# Patient Record
Sex: MALE | Race: ASIAN | NOT HISPANIC OR LATINO | Employment: FULL TIME | URBAN - METROPOLITAN AREA
[De-identification: names, ages, dates, MRNs, and addresses within clinical notes are randomized per-mention and may not be internally consistent; named-entity substitution may affect disease eponyms.]

---

## 2019-12-03 ENCOUNTER — OFFICE VISIT (OUTPATIENT)
Dept: FAMILY MEDICINE CLINIC | Facility: CLINIC | Age: 44
End: 2019-12-03

## 2019-12-03 VITALS
SYSTOLIC BLOOD PRESSURE: 118 MMHG | HEIGHT: 70 IN | HEART RATE: 67 BPM | OXYGEN SATURATION: 99 % | WEIGHT: 183 LBS | BODY MASS INDEX: 26.2 KG/M2 | DIASTOLIC BLOOD PRESSURE: 76 MMHG | TEMPERATURE: 97.5 F

## 2019-12-03 DIAGNOSIS — I10 ESSENTIAL HYPERTENSION: Primary | ICD-10-CM

## 2019-12-03 DIAGNOSIS — K21.9 GERD WITHOUT ESOPHAGITIS: ICD-10-CM

## 2019-12-03 DIAGNOSIS — E53.8 B12 DEFICIENCY: ICD-10-CM

## 2019-12-03 DIAGNOSIS — R20.0 NUMBNESS OF TONGUE: ICD-10-CM

## 2019-12-03 PROCEDURE — 99213 OFFICE O/P EST LOW 20 MIN: CPT | Performed by: FAMILY MEDICINE

## 2019-12-03 RX ORDER — METOPROLOL TARTRATE 50 MG/1
50 TABLET, FILM COATED ORAL DAILY
COMMUNITY
End: 2019-12-03 | Stop reason: SDUPTHER

## 2019-12-03 RX ORDER — METOPROLOL TARTRATE 50 MG/1
50 TABLET, FILM COATED ORAL DAILY
Qty: 90 TABLET | Refills: 1 | Status: SHIPPED | OUTPATIENT
Start: 2019-12-03 | End: 2020-03-11 | Stop reason: CLARIF

## 2019-12-03 NOTE — PROGRESS NOTES
Chief Complaint   Patient presents with   • Tongue     C/o tongue sensitivity.        Subjective   Deon Diaz is a 44 y.o. male.     History of Present Illness   Pt was having some chest pains/reflux over few weeks and worse with certain foods.  He started pantoprazole and started to feel better.  [place] was [place] this [place] and had a full cardiac work up done.  No SOB or palpitations.  Since he started the pantoprazole he has decreased taste sensation.  When he stopped it, it improved somewhat.  He now has burning feeling in mouth with spicy foods and he didn't used to have that issue in the past.  No dysphagia.  htn- no complaints and no HA or blurred vision.  He brought in all of his labwork and I have reviewed it all and the B12 was 314 which is slightly low.    The following portions of the patient's history were reviewed and updated as appropriate: allergies, current medications, past family history, past medical history, past social history, past surgical history and problem list.    Review of Systems   Constitutional: Negative for fatigue.   Eyes: Negative for blurred vision.   Respiratory: Negative for cough, chest tightness and shortness of breath.    Cardiovascular: Negative for chest pain, palpitations and leg swelling.   Neurological: Negative for dizziness, light-headedness and headache.       Patient Active Problem List   Diagnosis   • Subclinical hyperthyroidism   • IBS (irritable bowel syndrome)   • Essential hypertension   • GERD without esophagitis   • Numbness of tongue   • B12 deficiency       No Known Allergies      Current Outpatient Medications:   •  metoprolol tartrate (LOPRESSOR) 50 MG tablet, Take 1 tablet by mouth Daily., Disp: 90 tablet, Rfl: 1    Past Medical History:   Diagnosis Date   • Dysuria    • Encounter for preventive health examination    • Essential hypertension    • Fatigue    • Hematuria    • IBS (irritable bowel syndrome)    • Influenza    • Normal routine  "physical examination    • Subclinical hyperthyroidism        History reviewed. No pertinent surgical history.    Family History   Family history unknown: Yes       Social History     Tobacco Use   • Smoking status: Never Smoker   • Smokeless tobacco: Never Used   Substance Use Topics   • Alcohol use: No     Frequency: Never            Objective     Visit Vitals  /76   Pulse 67   Temp 97.5 °F (36.4 °C)   Ht 177.8 cm (70\")   Wt 83 kg (183 lb)   SpO2 99%   BMI 26.26 kg/m²       Physical Exam   Constitutional: He is oriented to person, place, and time. He appears well-developed. No distress.   HENT:   Mouth/Throat: No oropharyngeal exudate.   Eyes: Conjunctivae and lids are normal.   Neck: Trachea normal and normal range of motion. Neck supple. Carotid bruit is not present. No thyroid mass and no thyromegaly present.   Cardiovascular: Normal rate, regular rhythm and normal heart sounds.   Pulmonary/Chest: Effort normal and breath sounds normal.   Lymphadenopathy:     He has no cervical adenopathy.   Neurological: He is alert and oriented to person, place, and time. He is not disoriented. No cranial nerve deficit.   Skin: Skin is warm and dry.   Psychiatric: He has a normal mood and affect. His speech is normal and behavior is normal. He is attentive.         Procedures    Assessment/Plan   Problems Addressed this Visit        Cardiovascular and Mediastinum    Essential hypertension - Primary    Relevant Medications    metoprolol tartrate (LOPRESSOR) 50 MG tablet       Digestive    GERD without esophagitis    B12 deficiency       Nervous and Auditory    Numbness of tongue          No orders of the defined types were placed in this encounter.      Current Outpatient Medications   Medication Sig Dispense Refill   • metoprolol tartrate (LOPRESSOR) 50 MG tablet Take 1 tablet by mouth Daily. 90 tablet 1     No current facility-administered medications for this visit.      Start b12 pills and use omeprazole as needed.  "   No Follow-up on file.    There are no Patient Instructions on file for this visit.

## 2019-12-23 ENCOUNTER — TELEPHONE (OUTPATIENT)
Dept: FAMILY MEDICINE CLINIC | Facility: CLINIC | Age: 44
End: 2019-12-23

## 2020-01-02 ENCOUNTER — CLINICAL SUPPORT (OUTPATIENT)
Dept: FAMILY MEDICINE CLINIC | Facility: CLINIC | Age: 45
End: 2020-01-02

## 2020-01-02 DIAGNOSIS — E53.8 B12 DEFICIENCY: Primary | ICD-10-CM

## 2020-01-02 PROCEDURE — 96372 THER/PROPH/DIAG INJ SC/IM: CPT | Performed by: FAMILY MEDICINE

## 2020-01-02 RX ORDER — CYANOCOBALAMIN 1000 UG/ML
1000 INJECTION, SOLUTION INTRAMUSCULAR; SUBCUTANEOUS WEEKLY
Status: DISCONTINUED | OUTPATIENT
Start: 2020-01-02 | End: 2021-11-30 | Stop reason: ALTCHOICE

## 2020-01-02 RX ORDER — CYANOCOBALAMIN 1000 UG/ML
1000 INJECTION, SOLUTION INTRAMUSCULAR; SUBCUTANEOUS
Status: DISCONTINUED | OUTPATIENT
Start: 2020-01-02 | End: 2020-01-02

## 2020-01-02 RX ADMIN — CYANOCOBALAMIN 1000 MCG: 1000 INJECTION, SOLUTION INTRAMUSCULAR; SUBCUTANEOUS at 16:48

## 2020-01-09 ENCOUNTER — CLINICAL SUPPORT (OUTPATIENT)
Dept: FAMILY MEDICINE CLINIC | Facility: CLINIC | Age: 45
End: 2020-01-09

## 2020-01-09 DIAGNOSIS — E53.8 B12 DEFICIENCY: Primary | ICD-10-CM

## 2020-01-09 PROCEDURE — 96372 THER/PROPH/DIAG INJ SC/IM: CPT | Performed by: FAMILY MEDICINE

## 2020-01-09 RX ADMIN — CYANOCOBALAMIN 1000 MCG: 1000 INJECTION, SOLUTION INTRAMUSCULAR; SUBCUTANEOUS at 11:57

## 2020-01-16 ENCOUNTER — CLINICAL SUPPORT (OUTPATIENT)
Dept: FAMILY MEDICINE CLINIC | Facility: CLINIC | Age: 45
End: 2020-01-16

## 2020-01-16 DIAGNOSIS — E53.8 B12 DEFICIENCY: ICD-10-CM

## 2020-01-16 PROCEDURE — 96372 THER/PROPH/DIAG INJ SC/IM: CPT | Performed by: FAMILY MEDICINE

## 2020-01-16 RX ADMIN — CYANOCOBALAMIN 1000 MCG: 1000 INJECTION, SOLUTION INTRAMUSCULAR; SUBCUTANEOUS at 16:10

## 2020-01-30 ENCOUNTER — CLINICAL SUPPORT (OUTPATIENT)
Dept: FAMILY MEDICINE CLINIC | Facility: CLINIC | Age: 45
End: 2020-01-30

## 2020-01-30 DIAGNOSIS — E53.8 B12 DEFICIENCY: ICD-10-CM

## 2020-01-30 PROCEDURE — 96372 THER/PROPH/DIAG INJ SC/IM: CPT | Performed by: FAMILY MEDICINE

## 2020-01-30 RX ADMIN — CYANOCOBALAMIN 1000 MCG: 1000 INJECTION, SOLUTION INTRAMUSCULAR; SUBCUTANEOUS at 13:17

## 2020-03-10 ENCOUNTER — PATIENT MESSAGE (OUTPATIENT)
Dept: FAMILY MEDICINE CLINIC | Facility: CLINIC | Age: 45
End: 2020-03-10

## 2020-03-11 RX ORDER — METOPROLOL SUCCINATE 50 MG/1
50 TABLET, EXTENDED RELEASE ORAL DAILY
Qty: 90 TABLET | Refills: 0 | Status: SHIPPED | OUTPATIENT
Start: 2020-03-11 | End: 2020-06-05

## 2020-05-19 ENCOUNTER — TELEMEDICINE (OUTPATIENT)
Dept: FAMILY MEDICINE CLINIC | Facility: CLINIC | Age: 45
End: 2020-05-19

## 2020-05-19 DIAGNOSIS — J01.00 ACUTE NON-RECURRENT MAXILLARY SINUSITIS: Primary | ICD-10-CM

## 2020-05-19 PROCEDURE — 99213 OFFICE O/P EST LOW 20 MIN: CPT | Performed by: FAMILY MEDICINE

## 2020-05-19 RX ORDER — AMOXICILLIN 875 MG/1
875 TABLET, COATED ORAL 2 TIMES DAILY
Qty: 20 TABLET | Refills: 0 | Status: SHIPPED | OUTPATIENT
Start: 2020-05-19 | End: 2021-11-30 | Stop reason: ALTCHOICE

## 2020-05-19 NOTE — PROGRESS NOTES
Chief Complaint   Patient presents with   • Sinusitis       Subjective   Deon Worthy is a 44 y.o. male.     History of Present Illness   The patient presents today for follow-up via a video visit due to the COVID-19 pandemic for  Past 10 days he has been having left side sinus pain on cheek and forehead.  Over past few days he has been taking zyrtec and cold med.  Lots of drainage.  No fever or chills.  No SOB or wheezing.  No loss of taste or smell.            The following portions of the patient's history were reviewed and updated as appropriate: allergies, current medications, past family history, past medical history, past social history, past surgical history and problem list.    Review of Systems see above    Patient Active Problem List   Diagnosis   • Subclinical hyperthyroidism   • IBS (irritable bowel syndrome)   • Essential hypertension   • GERD without esophagitis   • Numbness of tongue   • B12 deficiency   • Acute non-recurrent maxillary sinusitis       No Known Allergies      Current Outpatient Medications:   •  amoxicillin (AMOXIL) 875 MG tablet, Take 1 tablet by mouth 2 (Two) Times a Day., Disp: 20 tablet, Rfl: 0  •  metoprolol succinate XL (TOPROL-XL) 50 MG 24 hr tablet, Take 1 tablet by mouth Daily., Disp: 90 tablet, Rfl: 0    Current Facility-Administered Medications:   •  cyanocobalamin injection 1,000 mcg, 1,000 mcg, Intramuscular, Weekly, Meenakshi Singh MD, 1,000 mcg at 01/30/20 1317    Past Medical History:   Diagnosis Date   • Encounter for preventive health examination    • Essential hypertension    • Hematuria    • IBS (irritable bowel syndrome)    • Normal routine physical examination    • Subclinical hyperthyroidism        Past Surgical History:   Procedure Laterality Date   • NO PAST SURGERIES         Family History   Problem Relation Age of Onset   • Hypertension Mother    • Hyperlipidemia Mother    • Heart failure Father        Social History     Tobacco Use   • Smoking  status: Never Smoker   • Smokeless tobacco: Never Used   Substance Use Topics   • Alcohol use: No     Frequency: Never            Objective     There were no vitals taken for this visit. Video Visit    Physical Exam  NAD  AAOx3  No labored breathing.  EOMI   PERRLA      No results found for: GLUCOSE, BUN, CREATININE, EGFRIFNONA, EGFRIFAFRI, BCR, K, CO2, CALCIUM, PROTENTOTREF, ALBUMIN, LABIL2, BILIRUBIN, AST, ALT    No results found for: WBC, RBC, HGB, HCT, MCV, MCH, MCHC, RDW, RDWSD, MPV, PLT, NEUTRORELPCT, LYMPHORELPCT, MONORELPCT, EOSRELPCT, BASORELPCT, AUTOIGPER, NEUTROABS, LYMPHSABS, MONOSABS, EOSABS, BASOSABS, AUTOIGNUM, NRBC    No results found for: HGBA1C    No results found for: [id number]    No results found for: TSH    No results found for: CHOL  No results found for: TRIG  No results found for: HDL  No results found for: LDL  No results found for: VLDL  No results found for: LDLHDL      Procedures    Assessment/Plan   Problems Addressed this Visit        Respiratory    Acute non-recurrent maxillary sinusitis - Primary    Relevant Medications    amoxicillin (AMOXIL) 875 MG tablet          No orders of the defined types were placed in this encounter.      Current Outpatient Medications   Medication Sig Dispense Refill   • amoxicillin (AMOXIL) 875 MG tablet Take 1 tablet by mouth 2 (Two) Times a Day. 20 tablet 0   • metoprolol succinate XL (TOPROL-XL) 50 MG 24 hr tablet Take 1 tablet by mouth Daily. 90 tablet 0     Current Facility-Administered Medications   Medication Dose Route Frequency Provider Last Rate Last Dose   • cyanocobalamin injection 1,000 mcg  1,000 mcg Intramuscular Weekly Meenakshi Singh MD   1,000 mcg at 01/30/20 1317       No follow-ups on file.    Patient Instructions   Continue to work on exercise.    Symptomatic treatment and otc meds and fluids and rest.  Follow up if no better.    Start abx.       Time spent on visit:  14   minutes.  This patient has consented to a telehealth visit  via video. The visit was scheduled as a video visit to comply with patient safety concerns in accordance with CDC recommendations.  All vitals recorded within this visit are reported by the patient.

## 2020-06-05 RX ORDER — METOPROLOL SUCCINATE 50 MG/1
TABLET, EXTENDED RELEASE ORAL
Qty: 90 TABLET | Refills: 0 | Status: SHIPPED | OUTPATIENT
Start: 2020-06-05 | End: 2020-11-25 | Stop reason: SDUPTHER

## 2020-11-24 RX ORDER — METOPROLOL SUCCINATE 50 MG/1
TABLET, EXTENDED RELEASE ORAL
Qty: 90 TABLET | Refills: 0 | OUTPATIENT
Start: 2020-11-24

## 2020-11-25 RX ORDER — METOPROLOL SUCCINATE 50 MG/1
50 TABLET, EXTENDED RELEASE ORAL DAILY
Qty: 90 TABLET | Refills: 0 | Status: SHIPPED | OUTPATIENT
Start: 2020-11-25 | End: 2021-03-26 | Stop reason: SDUPTHER

## 2020-11-25 NOTE — TELEPHONE ENCOUNTER
Please see the message from the patient below. I replied back asking that he call to schedule a video or telephone visit if he didn't want to come into the office. Can we send in a refill to get him through the extended weekend?     LOV 5/19/2020  Last refill 6/5/20 qty 90    Dr. Singh,     I would like to get the refill of my BP medication Metoprolol Succinate ER-50mg. The refill request I placed through my pharmacy was declined. I would like to get this refill without visiting the doctors office due to current COVID-19 situation. I have been checking by BP regularly[weekly] at home with BP monitor and it is consistently around 115/80. I have been taking the same medication and dosage for last 5+year. Please let me know if I can get one refill for this medication without visiting your office due to current circumstance.     Thanks,  Deon Worthy

## 2021-03-16 RX ORDER — METOPROLOL SUCCINATE 50 MG/1
TABLET, EXTENDED RELEASE ORAL
Qty: 90 TABLET | Refills: 0 | OUTPATIENT
Start: 2021-03-16

## 2021-03-26 ENCOUNTER — TELEMEDICINE (OUTPATIENT)
Dept: FAMILY MEDICINE CLINIC | Facility: CLINIC | Age: 46
End: 2021-03-26

## 2021-03-26 DIAGNOSIS — I10 ESSENTIAL HYPERTENSION: Primary | ICD-10-CM

## 2021-03-26 DIAGNOSIS — K58.8 OTHER IRRITABLE BOWEL SYNDROME: ICD-10-CM

## 2021-03-26 PROCEDURE — 99214 OFFICE O/P EST MOD 30 MIN: CPT | Performed by: FAMILY MEDICINE

## 2021-03-26 RX ORDER — DICYCLOMINE HYDROCHLORIDE 10 MG/1
CAPSULE ORAL
Qty: 90 CAPSULE | Refills: 3 | Status: SHIPPED | OUTPATIENT
Start: 2021-03-26 | End: 2021-11-30 | Stop reason: ALTCHOICE

## 2021-03-26 RX ORDER — METOPROLOL SUCCINATE 50 MG/1
50 TABLET, EXTENDED RELEASE ORAL DAILY
Qty: 90 TABLET | Refills: 1 | Status: SHIPPED | OUTPATIENT
Start: 2021-03-26 | End: 2021-10-04

## 2021-03-26 NOTE — PROGRESS NOTES
CC:  HTN    Subjective   Deon Worthy is a 45 y.o. male.     History of Present Illness   The patient presents today for follow-up via a video visit due to the COVID-19 pandemic for  Hypertension-no chest pain, blurry vision, headaches or palpitations.  He has been on this med dosage for past 5 years.  Over past few weeks it has been close to 120s/80-90.  He does exercise regularly.  But no cardio.    ibs- needs refills and doing well with medication.          The following portions of the patient's history were reviewed and updated as appropriate: allergies, current medications, past family history, past medical history, past social history, past surgical history and problem list.    Review of Systems    Patient Active Problem List   Diagnosis   • Subclinical hyperthyroidism   • IBS (irritable bowel syndrome)   • Essential hypertension   • GERD without esophagitis   • Numbness of tongue   • B12 deficiency   • Acute non-recurrent maxillary sinusitis       No Known Allergies      Current Outpatient Medications:   •  amoxicillin (AMOXIL) 875 MG tablet, Take 1 tablet by mouth 2 (Two) Times a Day., Disp: 20 tablet, Rfl: 0  •  dicyclomine (Bentyl) 10 MG capsule, Take on pill tid as needed, Disp: 90 capsule, Rfl: 3  •  metoprolol succinate XL (TOPROL-XL) 50 MG 24 hr tablet, Take 1 tablet by mouth Daily., Disp: 90 tablet, Rfl: 1    Current Facility-Administered Medications:   •  cyanocobalamin injection 1,000 mcg, 1,000 mcg, Intramuscular, Weekly, Meenakshi Singh MD, 1,000 mcg at 01/30/20 1317    Past Medical History:   Diagnosis Date   • Encounter for preventive health examination    • Essential hypertension    • Hematuria    • IBS (irritable bowel syndrome)    • Normal routine physical examination    • Subclinical hyperthyroidism        Past Surgical History:   Procedure Laterality Date   • NO PAST SURGERIES         Family History   Problem Relation Age of Onset   • Hypertension Mother    • Hyperlipidemia  Mother    • Heart failure Father        Social History     Tobacco Use   • Smoking status: Never Smoker   • Smokeless tobacco: Never Used   Substance Use Topics   • Alcohol use: No            Objective     There were no vitals taken for this visit. Video Visit    Physical Exam  NAD  AAOx3  No labored breathing.    No results found for: GLUCOSE, BUN, CREATININE, EGFRIFNONA, EGFRIFAFRI, BCR, K, CO2, CALCIUM, PROTENTOTREF, ALBUMIN, LABIL2, BILIRUBIN, AST, ALT    No results found for: WBC, RBC, HGB, HCT, MCV, MCH, MCHC, RDW, RDWSD, MPV, PLT, NEUTRORELPCT, LYMPHORELPCT, MONORELPCT, EOSRELPCT, BASORELPCT, AUTOIGPER, NEUTROABS, LYMPHSABS, MONOSABS, EOSABS, BASOSABS, AUTOIGNUM, NRBC    No results found for: HGBA1C    No results found for: TPTACOAO27    No results found for: TSH    No results found for: CHOL  No results found for: TRIG  No results found for: HDL  No results found for: LDL  No results found for: VLDL  No results found for: LDLHDL      Procedures    Assessment/Plan   Problems Addressed this Visit     IBS (irritable bowel syndrome)    Relevant Medications    dicyclomine (Bentyl) 10 MG capsule    Essential hypertension - Primary    Relevant Medications    metoprolol succinate XL (TOPROL-XL) 50 MG 24 hr tablet      Diagnoses       Codes Comments    Essential hypertension    -  Primary ICD-10-CM: I10  ICD-9-CM: 401.9     Other irritable bowel syndrome     ICD-10-CM: K58.8  ICD-9-CM: 564.1           No orders of the defined types were placed in this encounter.      Current Outpatient Medications   Medication Sig Dispense Refill   • amoxicillin (AMOXIL) 875 MG tablet Take 1 tablet by mouth 2 (Two) Times a Day. 20 tablet 0   • dicyclomine (Bentyl) 10 MG capsule Take on pill tid as needed 90 capsule 3   • metoprolol succinate XL (TOPROL-XL) 50 MG 24 hr tablet Take 1 tablet by mouth Daily. 90 tablet 1     Current Facility-Administered Medications   Medication Dose Route Frequency Provider Last Rate Last Admin   •  cyanocobalamin injection 1,000 mcg  1,000 mcg Intramuscular Weekly Meenakshi Singh MD   1,000 mcg at 01/30/20 1317       No follow-ups on file.    There are no Patient Instructions on file for this visit.         Time spent on visit:  23   minutes.  This patient has consented to a telehealth visit via video. The visit was scheduled as a video visit to comply with patient safety concerns in accordance with CDC recommendations.  All vitals recorded within this visit are reported by the patient.  Will get blood work once he gets his second vaccine.    He will take 100 mg of next week .    Patient to monitor BP over next week and call me with readings at that time and we can make adjustments accordingly if needed.  Given warning signs for stroke and MI.    Will get labs when he makes his CPE.    Will order a EKG for earlier.

## 2021-10-04 DIAGNOSIS — I10 ESSENTIAL HYPERTENSION: ICD-10-CM

## 2021-10-04 RX ORDER — METOPROLOL SUCCINATE 50 MG/1
TABLET, EXTENDED RELEASE ORAL
Qty: 30 TABLET | Refills: 0 | Status: SHIPPED | OUTPATIENT
Start: 2021-10-04 | End: 2021-10-28 | Stop reason: SDUPTHER

## 2021-10-04 NOTE — TELEPHONE ENCOUNTER
Rx Refill Note  Requested Prescriptions     Pending Prescriptions Disp Refills   • metoprolol succinate XL (TOPROL-XL) 50 MG 24 hr tablet [Pharmacy Med Name: METOPROLOL SUCC ER 50 MG TAB] 90 tablet 1     Sig: TAKE 1 TABLET BY MOUTH EVERY DAY      Last office visit with prescribing clinician:   3/26/2021  Next office visit with prescribing clinician: Visit date not found            Roberto Whitman MA  10/04/21, 09:52 EDT

## 2021-10-25 ENCOUNTER — TELEPHONE (OUTPATIENT)
Dept: FAMILY MEDICINE CLINIC | Facility: CLINIC | Age: 46
End: 2021-10-25

## 2021-10-25 NOTE — TELEPHONE ENCOUNTER
FYI:  Pt called stating he was having chest discomfort and requested a EKG. Pt was instructed to go to the ER for higher level of care.

## 2021-10-28 ENCOUNTER — OFFICE VISIT (OUTPATIENT)
Dept: FAMILY MEDICINE CLINIC | Facility: CLINIC | Age: 46
End: 2021-10-28

## 2021-10-28 VITALS
WEIGHT: 178.2 LBS | SYSTOLIC BLOOD PRESSURE: 114 MMHG | BODY MASS INDEX: 25.51 KG/M2 | TEMPERATURE: 97.8 F | HEIGHT: 70 IN | OXYGEN SATURATION: 100 % | RESPIRATION RATE: 16 BRPM | HEART RATE: 77 BPM | DIASTOLIC BLOOD PRESSURE: 70 MMHG

## 2021-10-28 DIAGNOSIS — I10 ESSENTIAL HYPERTENSION: ICD-10-CM

## 2021-10-28 DIAGNOSIS — E55.9 VITAMIN D DEFICIENCY: ICD-10-CM

## 2021-10-28 DIAGNOSIS — R07.81 RIB PAIN ON LEFT SIDE: ICD-10-CM

## 2021-10-28 DIAGNOSIS — R07.89 CHEST DISCOMFORT: Primary | ICD-10-CM

## 2021-10-28 PROCEDURE — 99214 OFFICE O/P EST MOD 30 MIN: CPT | Performed by: FAMILY MEDICINE

## 2021-10-28 PROCEDURE — 93000 ELECTROCARDIOGRAM COMPLETE: CPT | Performed by: FAMILY MEDICINE

## 2021-10-28 RX ORDER — METOPROLOL SUCCINATE 50 MG/1
50 TABLET, EXTENDED RELEASE ORAL DAILY
Qty: 90 TABLET | Refills: 1 | Status: SHIPPED | OUTPATIENT
Start: 2021-10-28 | End: 2022-05-20

## 2021-10-28 NOTE — PROGRESS NOTES
"Chief Complaint  Rib Pain (left rib pain)    Subjective          Deon Worthy presents to [facility]  History of Present Illness  PT is here for refills and  HTN- no HA or blurred vision  He has some rib pain on the left side for the past 3-4 days.  He has had a similar episode some time ago and went away on its own with heat and ice.  No radiation of the pain.    He had lab work done in [place] and showed Vit D was at 27  Objective   Vital Signs:   /70 (BP Location: Right arm, Patient Position: Sitting, Cuff Size: Large Adult)   Pulse 77   Temp 97.8 °F (36.6 °C) (Temporal)   Resp 16   Ht 177.8 cm (70\")   Wt 80.8 kg (178 lb 3.2 oz)   SpO2 100%   BMI 25.57 kg/m²     Physical Exam  Vitals and nursing note reviewed.   Constitutional:       Appearance: Normal appearance.   Cardiovascular:      Rate and Rhythm: Normal rate and regular rhythm.      Heart sounds: Normal heart sounds. No murmur heard.      Pulmonary:      Effort: Pulmonary effort is normal. No respiratory distress.      Breath sounds: Normal breath sounds. No stridor.      Comments: Some ttp lower ribs left side ant and laterally.  Neurological:      General: No focal deficit present.      Mental Status: He is alert and oriented to person, place, and time.   Psychiatric:         Mood and Affect: Mood normal.     Answers for HPI/ROS submitted by the patient on 10/26/2021  What is the primary reason for your visit?: High Blood Pressure       Result Review :            ECG 12 Lead    Date/Time: 10/28/2021 1:38 PM  Performed by: Meenakshi Singh MD  Authorized by: Meenakshi Singh MD   Comparison: not compared with previous ECG   Rhythm: sinus rhythm  Conduction: conduction normal  ST Segments: ST segments normal  T Waves: T waves normal  Other: no other findings    Clinical impression: normal ECG              Assessment and Plan    Diagnoses and all orders for this visit:    1. Chest discomfort (Primary)  -   "   ECG 12 Lead    2. Essential hypertension  -     metoprolol succinate XL (TOPROL-XL) 50 MG 24 hr tablet; Take 1 tablet by mouth Daily.  Dispense: 90 tablet; Refill: 1    3. Vitamin D deficiency    4. Rib pain on left side        Follow Up   Return in about 6 months (around 4/28/2022) for hypertension.  Patient was given instructions and counseling regarding his condition or for health maintenance advice. Please see specific information pulled into the AVS if appropriate.     Will get EKG, start Vit D otc supplement.Answers for HPI/ROS submitted by the patient on 10/26/2021  Refills and EKG normal.    sxtx with ice and heat and nsaids.  Follow up if no better.

## 2021-11-30 ENCOUNTER — OFFICE VISIT (OUTPATIENT)
Dept: CARDIOLOGY | Facility: CLINIC | Age: 46
End: 2021-11-30

## 2021-11-30 VITALS
OXYGEN SATURATION: 99 % | WEIGHT: 180 LBS | DIASTOLIC BLOOD PRESSURE: 84 MMHG | HEART RATE: 85 BPM | HEIGHT: 70 IN | BODY MASS INDEX: 25.77 KG/M2 | SYSTOLIC BLOOD PRESSURE: 126 MMHG

## 2021-11-30 DIAGNOSIS — I10 ESSENTIAL HYPERTENSION: ICD-10-CM

## 2021-11-30 DIAGNOSIS — R07.89 CHEST DISCOMFORT: Primary | ICD-10-CM

## 2021-11-30 PROCEDURE — 99204 OFFICE O/P NEW MOD 45 MIN: CPT | Performed by: INTERNAL MEDICINE

## 2021-12-03 NOTE — PROGRESS NOTES
"      CARDIOLOGY    Sae Becker MD    ENCOUNTER DATE:  2021    Deon Worthy / 46 y.o. / male        CHIEF COMPLAINT / REASON FOR OFFICE VISIT     Family history of heart disease      HISTORY OF PRESENT ILLNESS       HPI  Deon Worthy is a 46 y.o. male who presents today for consultation.  Patient has a very strong family history of coronary artery disease.  Patient had his 1st myocardial infarction at the age of 54 he subsequently  the age of 62.  Patient remains very active.  He does either a treadmill or elliptical 4 to 5 days a week exercising between 30 and 45 minutes a day.  Patient started having some episodes of chest discomfort.  He says it is on the left side of his chest in the lateral aspect and says if he pushes on it he can change its character.  However with a strong family history the still concerned him so he was seen today for further evaluation.      The following portions of the patient's history were reviewed and updated as appropriate: allergies, current medications, past family history, past medical history, past social history, past surgical history and problem list.      VITAL SIGNS     Visit Vitals  /84 (BP Location: Left arm)   Pulse 85   Ht 177.8 cm (70\")   Wt 81.6 kg (180 lb)   SpO2 99%   BMI 25.83 kg/m²         Wt Readings from Last 3 Encounters:   21 81.6 kg (180 lb)   10/28/21 80.8 kg (178 lb 3.2 oz)   19 83 kg (183 lb)     Body mass index is 25.83 kg/m².      REVIEW OF SYSTEMS   Review of Systems   All other systems reviewed and are negative.          PHYSICAL EXAMINATION     Vitals reviewed.   Constitutional:       Appearance: Healthy appearance.   Pulmonary:      Effort: Pulmonary effort is normal.   Cardiovascular:      Normal rate. Regular rhythm. Normal S1. Normal S2.      Murmurs: There is no murmur.      No gallop. No click. No rub.   Pulses:     Intact distal pulses.   Edema:     Peripheral edema absent.   Neurological:      " Mental Status: Alert and oriented to person, place and time.           REVIEWED DATA     Procedures    Cardiac Procedures:  1.           ASSESSMENT & PLAN      Diagnosis Plan   1. Chest discomfort  Treadmill Stress Test   2. Essential hypertension           SUMMARY/DISCUSSION  1. Atypical chest discomfort but a strong family history.  He did bring an echocardiogram from [place] he said he used to get an echo every year.  Results were normal he did not really have any valvular abnormalities.  In light of that I told him is not really appropriate to do a echo every year.  He also get a stress test from time to time looking back he did very well with it.  Blood pressure did go up on his last treadmill stress test.  With the current symptoms I did set him up for a plain treadmill.  We will see if he has a discomfort which again is very typical to be muscle skeletal in origin based on his description but we will also see what happens to his blood pressure.  If unremarkable I will see him back in a year sooner if he has issues.        MEDICATIONS         Discharge Medications          Accurate as of November 30, 2021 11:59 PM. If you have any questions, ask your nurse or doctor.            Continue These Medications      Instructions Start Date   metoprolol succinate XL 50 MG 24 hr tablet  Commonly known as: TOPROL-XL   50 mg, Oral, Daily                 **Dragon Disclaimer:   Much of this encounter note is an electronic transcription/translation of spoken language to printed text. The electronic translation of spoken language may permit erroneous, or at times, nonsensical words or phrases to be inadvertently transcribed. Although I have reviewed the note for such errors, some may still exist.

## 2021-12-09 ENCOUNTER — HOSPITAL ENCOUNTER (OUTPATIENT)
Dept: CARDIOLOGY | Facility: HOSPITAL | Age: 46
Discharge: HOME | End: 2021-12-09
Admitting: INTERNAL MEDICINE

## 2021-12-09 DIAGNOSIS — R07.89 CHEST DISCOMFORT: ICD-10-CM

## 2021-12-09 LAB
BH CV STRESS BP STAGE 1: NORMAL
BH CV STRESS BP STAGE 2: NORMAL
BH CV STRESS BP STAGE 3: NORMAL
BH CV STRESS BP STAGE 4: NORMAL
BH CV STRESS DURATION MIN STAGE 1: 3
BH CV STRESS DURATION MIN STAGE 2: 3
BH CV STRESS DURATION MIN STAGE 3: 3
BH CV STRESS DURATION MIN STAGE 4: 3
BH CV STRESS DURATION SEC STAGE 1: 0
BH CV STRESS DURATION SEC STAGE 2: 0
BH CV STRESS DURATION SEC STAGE 3: 0
BH CV STRESS DURATION SEC STAGE 4: 0
BH CV STRESS DURATION SEC STAGE 5: 30
BH CV STRESS GRADE STAGE 1: 10
BH CV STRESS GRADE STAGE 2: 12
BH CV STRESS GRADE STAGE 3: 14
BH CV STRESS GRADE STAGE 4: 16
BH CV STRESS GRADE STAGE 5: 18
BH CV STRESS HR STAGE 1: 115
BH CV STRESS HR STAGE 2: 141
BH CV STRESS HR STAGE 3: 162
BH CV STRESS HR STAGE 4: 174
BH CV STRESS HR STAGE 5: 179
BH CV STRESS METS STAGE 1: 5
BH CV STRESS METS STAGE 2: 7.5
BH CV STRESS METS STAGE 3: 10
BH CV STRESS METS STAGE 4: 13.5
BH CV STRESS METS STAGE 5: 15
BH CV STRESS PROTOCOL 1: NORMAL
BH CV STRESS RECOVERY BP: NORMAL MMHG
BH CV STRESS RECOVERY HR: 112 BPM
BH CV STRESS SPEED STAGE 1: 1.7
BH CV STRESS SPEED STAGE 2: 2.5
BH CV STRESS SPEED STAGE 3: 3.4
BH CV STRESS SPEED STAGE 4: 4.2
BH CV STRESS SPEED STAGE 5: 5
BH CV STRESS STAGE 1: 1
BH CV STRESS STAGE 2: 2
BH CV STRESS STAGE 3: 3
BH CV STRESS STAGE 4: 4
BH CV STRESS STAGE 5: 5
MAXIMAL PREDICTED HEART RATE: 173 BPM
PERCENT MAX PREDICTED HR: 103.47 %
STRESS BASELINE BP: NORMAL MMHG
STRESS BASELINE HR: 75 BPM
STRESS PERCENT HR: 122 %
STRESS POST ESTIMATED WORKLOAD: 14 METS
STRESS POST EXERCISE DUR MIN: 12 MIN
STRESS POST EXERCISE DUR SEC: 30 SEC
STRESS POST PEAK BP: NORMAL MMHG
STRESS POST PEAK HR: 179 BPM
STRESS TARGET HR: 147 BPM

## 2021-12-09 PROCEDURE — 93016 CV STRESS TEST SUPVJ ONLY: CPT | Performed by: INTERNAL MEDICINE

## 2021-12-09 PROCEDURE — 93018 CV STRESS TEST I&R ONLY: CPT | Performed by: INTERNAL MEDICINE

## 2021-12-09 PROCEDURE — 93017 CV STRESS TEST TRACING ONLY: CPT

## 2022-05-20 DIAGNOSIS — I10 ESSENTIAL HYPERTENSION: ICD-10-CM

## 2022-05-20 RX ORDER — METOPROLOL SUCCINATE 50 MG/1
TABLET, EXTENDED RELEASE ORAL
Qty: 30 TABLET | Refills: 0 | Status: SHIPPED | OUTPATIENT
Start: 2022-05-20 | End: 2022-08-08 | Stop reason: SDUPTHER

## 2022-05-20 NOTE — TELEPHONE ENCOUNTER
Rx Refill Note  Requested Prescriptions     Pending Prescriptions Disp Refills   • metoprolol succinate XL (TOPROL-XL) 50 MG 24 hr tablet [Pharmacy Med Name: METOPROLOL SUCC ER 50 MG TAB] 90 tablet 1     Sig: TAKE 1 TABLET BY MOUTH EVERY DAY      Last office visit with prescribing clinician: 10/28/2021      Next office visit with prescribing clinician: 4/28/22      {TIP  Please add Last Relevant Lab Date if appropriate:23}     Tawnya Garcia MA  05/20/22, 08:45 EDT

## 2022-08-08 ENCOUNTER — OFFICE VISIT (OUTPATIENT)
Dept: FAMILY MEDICINE CLINIC | Facility: CLINIC | Age: 47
End: 2022-08-08

## 2022-08-08 VITALS
TEMPERATURE: 96.4 F | DIASTOLIC BLOOD PRESSURE: 78 MMHG | RESPIRATION RATE: 16 BRPM | HEART RATE: 71 BPM | HEIGHT: 70 IN | OXYGEN SATURATION: 100 % | WEIGHT: 180.2 LBS | BODY MASS INDEX: 25.8 KG/M2 | SYSTOLIC BLOOD PRESSURE: 110 MMHG

## 2022-08-08 DIAGNOSIS — E05.90 SUBCLINICAL HYPERTHYROIDISM: ICD-10-CM

## 2022-08-08 DIAGNOSIS — K58.8 OTHER IRRITABLE BOWEL SYNDROME: ICD-10-CM

## 2022-08-08 DIAGNOSIS — I10 ESSENTIAL HYPERTENSION: ICD-10-CM

## 2022-08-08 DIAGNOSIS — Z79.899 HIGH RISK MEDICATION USE: ICD-10-CM

## 2022-08-08 DIAGNOSIS — Z13.6 SCREENING FOR CARDIOVASCULAR CONDITION: ICD-10-CM

## 2022-08-08 DIAGNOSIS — Z00.00 ENCOUNTER FOR PREVENTIVE HEALTH EXAMINATION: Primary | ICD-10-CM

## 2022-08-08 PROCEDURE — 99396 PREV VISIT EST AGE 40-64: CPT | Performed by: FAMILY MEDICINE

## 2022-08-08 RX ORDER — METOPROLOL SUCCINATE 50 MG/1
50 TABLET, EXTENDED RELEASE ORAL DAILY
Qty: 90 TABLET | Refills: 1 | Status: SHIPPED | OUTPATIENT
Start: 2022-08-08 | End: 2023-01-24 | Stop reason: SDUPTHER

## 2022-08-08 RX ORDER — DICYCLOMINE HYDROCHLORIDE 10 MG/1
CAPSULE ORAL
Qty: 90 CAPSULE | Refills: 3 | Status: SHIPPED | OUTPATIENT
Start: 2022-08-08

## 2022-08-08 NOTE — PROGRESS NOTES
"Chief Complaint  Annual Exam    Subjective        Deon Worthy presents to [facility]  History of Present Illness  PT is here for refills and CPE.  He walks daily.  No tobacco, no etoh, no drug use.  No other complaints.   HTN- no complaints.   He had some blood work done in [place]  Objective   Vital Signs:  /78 (BP Location: Right arm, Patient Position: Sitting, Cuff Size: Adult)   Pulse 71   Temp 96.4 °F (35.8 °C) (Temporal)   Resp 16   Ht 177.8 cm (70\")   Wt 81.7 kg (180 lb 3.2 oz)   SpO2 100%   BMI 25.86 kg/m²   Estimated body mass index is 25.86 kg/m² as calculated from the following:    Height as of this encounter: 177.8 cm (70\").    Weight as of this encounter: 81.7 kg (180 lb 3.2 oz).          Physical Exam  Vitals and nursing note reviewed.   Constitutional:       General: He is not in acute distress.     Appearance: Normal appearance. He is well-developed. He is not ill-appearing, toxic-appearing or diaphoretic.   HENT:      Head: Normocephalic and atraumatic.      Right Ear: Tympanic membrane, ear canal and external ear normal. There is no impacted cerumen.      Left Ear: Tympanic membrane, ear canal and external ear normal. There is no impacted cerumen.      Nose: Nose normal. No congestion or rhinorrhea.      Mouth/Throat:      Mouth: Mucous membranes are moist.      Pharynx: Oropharynx is clear. No oropharyngeal exudate or posterior oropharyngeal erythema.   Eyes:      General: No scleral icterus.        Right eye: No discharge.         Left eye: No discharge.      Extraocular Movements: Extraocular movements intact.      Conjunctiva/sclera: Conjunctivae normal.      Pupils: Pupils are equal, round, and reactive to light.   Neck:      Thyroid: No thyromegaly.      Vascular: No carotid bruit or JVD.      Trachea: No tracheal deviation.   Cardiovascular:      Rate and Rhythm: Normal rate and regular rhythm.      Heart sounds: Normal heart sounds. No murmur " heard.    No friction rub. No gallop.   Pulmonary:      Effort: Pulmonary effort is normal. No respiratory distress.      Breath sounds: Normal breath sounds. No stridor. No wheezing, rhonchi or rales.   Chest:      Chest wall: No tenderness.   Abdominal:      General: Bowel sounds are normal. There is no distension.      Palpations: Abdomen is soft. There is no mass.      Tenderness: There is no abdominal tenderness. There is no right CVA tenderness, left CVA tenderness, guarding or rebound.      Hernia: No hernia is present.   Musculoskeletal:         General: Normal range of motion.      Cervical back: Normal range of motion and neck supple. No rigidity or tenderness.      Right lower leg: No edema.      Left lower leg: No edema.   Lymphadenopathy:      Cervical: No cervical adenopathy.   Skin:     General: Skin is warm and dry.      Coloration: Skin is not jaundiced.   Neurological:      General: No focal deficit present.      Mental Status: He is alert and oriented to person, place, and time. Mental status is at baseline.      Sensory: No sensory deficit.      Motor: No weakness or abnormal muscle tone.      Coordination: Coordination normal.      Gait: Gait normal.      Deep Tendon Reflexes: Reflexes normal.   Psychiatric:         Mood and Affect: Mood normal.         Behavior: Behavior normal.         Thought Content: Thought content normal.         Judgment: Judgment normal.        Result Review :                Assessment and Plan   Diagnoses and all orders for this visit:    1. Encounter for preventive health examination (Primary)    2. Essential hypertension  -     metoprolol succinate XL (TOPROL-XL) 50 MG 24 hr tablet; Take 1 tablet by mouth Daily.  Dispense: 90 tablet; Refill: 1    3. Screening for cardiovascular condition  -     Comprehensive Metabolic Panel  -     Lipid Panel    4. Subclinical hyperthyroidism    5. High risk medication use    6. Other irritable bowel syndrome  -     dicyclomine  (Bentyl) 10 MG capsule; Take on pill tid as needed  Dispense: 90 capsule; Refill: 3             Follow Up   Return in about 6 months (around 2/8/2023) for hypertension.  Patient was given instructions and counseling regarding his condition or for health maintenance advice. Please see specific information pulled into the AVS if appropriate.     CPE done and work on diet and exercise.   Labs today.  I reviewed his labs he had done in April in [place].

## 2022-08-09 LAB
ALBUMIN SERPL-MCNC: 4.7 G/DL (ref 4–5)
ALBUMIN/GLOB SERPL: 1.6 {RATIO} (ref 1.2–2.2)
ALP SERPL-CCNC: 82 IU/L (ref 44–121)
ALT SERPL-CCNC: 28 IU/L (ref 0–44)
AST SERPL-CCNC: 23 IU/L (ref 0–40)
BILIRUB SERPL-MCNC: 0.5 MG/DL (ref 0–1.2)
BUN SERPL-MCNC: 10 MG/DL (ref 6–24)
BUN/CREAT SERPL: 11 (ref 9–20)
CALCIUM SERPL-MCNC: 9.8 MG/DL (ref 8.7–10.2)
CHLORIDE SERPL-SCNC: 103 MMOL/L (ref 96–106)
CHOLEST SERPL-MCNC: 177 MG/DL (ref 100–199)
CO2 SERPL-SCNC: 24 MMOL/L (ref 20–29)
CREAT SERPL-MCNC: 0.93 MG/DL (ref 0.76–1.27)
EGFRCR SERPLBLD CKD-EPI 2021: 101 ML/MIN/1.73
GLOBULIN SER CALC-MCNC: 3 G/DL (ref 1.5–4.5)
GLUCOSE SERPL-MCNC: 85 MG/DL (ref 65–99)
HDLC SERPL-MCNC: 26 MG/DL
LDLC SERPL CALC-MCNC: 120 MG/DL (ref 0–99)
POTASSIUM SERPL-SCNC: 4.6 MMOL/L (ref 3.5–5.2)
PROT SERPL-MCNC: 7.7 G/DL (ref 6–8.5)
SODIUM SERPL-SCNC: 142 MMOL/L (ref 134–144)
TRIGL SERPL-MCNC: 170 MG/DL (ref 0–149)
VLDLC SERPL CALC-MCNC: 31 MG/DL (ref 5–40)

## 2023-01-24 ENCOUNTER — OFFICE VISIT (OUTPATIENT)
Dept: FAMILY MEDICINE CLINIC | Facility: CLINIC | Age: 48
End: 2023-01-24
Payer: COMMERCIAL

## 2023-01-24 VITALS
HEART RATE: 72 BPM | DIASTOLIC BLOOD PRESSURE: 78 MMHG | HEIGHT: 70 IN | SYSTOLIC BLOOD PRESSURE: 124 MMHG | RESPIRATION RATE: 18 BRPM | TEMPERATURE: 97.5 F | BODY MASS INDEX: 26.05 KG/M2 | OXYGEN SATURATION: 98 % | WEIGHT: 182 LBS

## 2023-01-24 DIAGNOSIS — I10 ESSENTIAL HYPERTENSION: Primary | ICD-10-CM

## 2023-01-24 DIAGNOSIS — Z12.11 SCREEN FOR COLON CANCER: ICD-10-CM

## 2023-01-24 PROCEDURE — 99213 OFFICE O/P EST LOW 20 MIN: CPT | Performed by: FAMILY MEDICINE

## 2023-01-24 RX ORDER — METOPROLOL SUCCINATE 50 MG/1
50 TABLET, EXTENDED RELEASE ORAL DAILY
Qty: 90 TABLET | Refills: 1 | Status: SHIPPED | OUTPATIENT
Start: 2023-01-24

## 2023-01-24 NOTE — PROGRESS NOTES
"Chief Complaint  Hypertension    Subjective        Deon Worthy presents to [facility]  History of Present Illness  PT is here for refills, labs and  HTN- no HA and staying controlled at home.  He has had a normal stress test due to occasional CP.  No SOB.  Regular exercise.    He has area on scrotum that looks like white dots and uses antifungal and then goes away but comes back.  No pain.  No penile discharge.    Objective    Vital Signs:  /78 (BP Location: Left arm, Patient Position: Sitting, Cuff Size: Large Adult)   Pulse 72   Temp 97.5 °F (36.4 °C) (Temporal)   Resp 18   Ht 177.8 cm (70\")   Wt 82.6 kg (182 lb)   SpO2 98%   BMI 26.11 kg/m²   Estimated body mass index is 26.11 kg/m² as calculated from the following:    Height as of this encounter: 177.8 cm (70\").    Weight as of this encounter: 82.6 kg (182 lb).       BMI is >= 25 and <30. (Overweight) The following options were offered after discussion;: weight loss educational material (shared in after visit summary)      Physical Exam  Vitals and nursing note reviewed.   Constitutional:       Appearance: Normal appearance. He is well-developed.   Cardiovascular:      Rate and Rhythm: Normal rate and regular rhythm.      Heart sounds: Normal heart sounds. No murmur heard.  Pulmonary:      Effort: Pulmonary effort is normal. No respiratory distress.      Breath sounds: Normal breath sounds. No stridor. No wheezing or rhonchi.   Neurological:      General: No focal deficit present.      Mental Status: He is alert and oriented to person, place, and time. He is not disoriented.   Psychiatric:         Mood and Affect: Mood normal.         Behavior: Behavior normal.        Result Review :                   Assessment and Plan   Diagnoses and all orders for this visit:    1. Essential hypertension (Primary)  -     metoprolol succinate XL (TOPROL-XL) 50 MG 24 hr tablet; Take 1 tablet by mouth Daily.  Dispense: 90 " tablet; Refill: 1    2. Screen for colon cancer  -     Ambulatory Referral For Screening Colonoscopy             Follow Up   No follow-ups on file.  Patient was given instructions and counseling regarding his condition or for health maintenance advice. Please see specific information pulled into the AVS if appropriate.     Continue diet and exercise.  Refills today.

## 2023-04-26 ENCOUNTER — TELEPHONE (OUTPATIENT)
Dept: GASTROENTEROLOGY | Facility: CLINIC | Age: 48
End: 2023-04-26
Payer: COMMERCIAL

## 2023-04-26 NOTE — TELEPHONE ENCOUNTER
CALLER: Deon Worthy     RELATIONSHIP TO PATIENT: Self    BEST CALL BACK NUMBER: [phone]    CALL REGARDING: Patient stated that he has returned questionnaire into office a couple of weeks ago and has not heard anything.    Informed that I am sending message over to Ms. Sarah for colonoscopy screening and once reviewed by doctor then the colonoscopy can be scheduled.    Patient Request a Call Back

## 2023-04-27 ENCOUNTER — PREP FOR SURGERY (OUTPATIENT)
Dept: OTHER | Facility: HOSPITAL | Age: 48
End: 2023-04-27
Payer: COMMERCIAL

## 2023-04-27 ENCOUNTER — PRE-PROCEDURE SCREENING (OUTPATIENT)
Dept: GASTROENTEROLOGY | Facility: CLINIC | Age: 48
End: 2023-04-27
Payer: COMMERCIAL

## 2023-04-27 DIAGNOSIS — Z12.11 ENCOUNTER FOR SCREENING FOR MALIGNANT NEOPLASM OF COLON: Primary | ICD-10-CM

## 2023-04-27 NOTE — TELEPHONE ENCOUNTER
Colonoscopy screening--No personal history of polyps--No family history of polyps or colon cancer--No blood thinners--Medications:                 dicyclomine (Bentyl) 10 MG capsule  metoprolol succinate XL (TOPROL-XL) 50 MG 24 hr tablet                  QUESTIONNAIRE SCREENING  HAS BEEN SENT TO DOCTOR FOR REVIEW

## 2023-05-08 ENCOUNTER — TELEPHONE (OUTPATIENT)
Dept: GASTROENTEROLOGY | Facility: CLINIC | Age: 48
End: 2023-05-08
Payer: COMMERCIAL

## 2023-05-08 NOTE — TELEPHONE ENCOUNTER
OK FOR HUB TO READ  PT IS [facility] FOR COLONOSCOPY ON 9/8/23 AT 1230 PT MAILED [facility] PREP PT WAS [facility]ITCH[facility] W/ REBECCA

## 2023-06-13 ENCOUNTER — TELEPHONE (OUTPATIENT)
Dept: GASTROENTEROLOGY | Facility: CLINIC | Age: 48
End: 2023-06-13
Payer: COMMERCIAL

## 2023-07-06 VITALS
SYSTOLIC BLOOD PRESSURE: 107 MMHG | SYSTOLIC BLOOD PRESSURE: 133 MMHG | DIASTOLIC BLOOD PRESSURE: 79 MMHG | OXYGEN SATURATION: 98 % | DIASTOLIC BLOOD PRESSURE: 78 MMHG | DIASTOLIC BLOOD PRESSURE: 60 MMHG | RESPIRATION RATE: 15 BRPM | RESPIRATION RATE: 16 BRPM | DIASTOLIC BLOOD PRESSURE: 59 MMHG | HEART RATE: 67 BPM | HEART RATE: 62 BPM | OXYGEN SATURATION: 100 % | SYSTOLIC BLOOD PRESSURE: 110 MMHG | DIASTOLIC BLOOD PRESSURE: 62 MMHG | HEART RATE: 68 BPM | RESPIRATION RATE: 14 BRPM | HEIGHT: 71 IN | RESPIRATION RATE: 10 BRPM | DIASTOLIC BLOOD PRESSURE: 85 MMHG | DIASTOLIC BLOOD PRESSURE: 65 MMHG | DIASTOLIC BLOOD PRESSURE: 61 MMHG | HEART RATE: 61 BPM | HEART RATE: 64 BPM | SYSTOLIC BLOOD PRESSURE: 99 MMHG | HEART RATE: 78 BPM | HEART RATE: 63 BPM | WEIGHT: 172 LBS | TEMPERATURE: 97.1 F | SYSTOLIC BLOOD PRESSURE: 118 MMHG | RESPIRATION RATE: 13 BRPM | OXYGEN SATURATION: 99 % | HEART RATE: 65 BPM | DIASTOLIC BLOOD PRESSURE: 58 MMHG | RESPIRATION RATE: 12 BRPM | SYSTOLIC BLOOD PRESSURE: 98 MMHG | RESPIRATION RATE: 22 BRPM | SYSTOLIC BLOOD PRESSURE: 100 MMHG

## 2023-07-10 PROBLEM — Z12.11 SCREENING FOR COLONIC NEOPLASIA: Status: ACTIVE | Noted: 2023-07-11

## 2023-07-11 ENCOUNTER — OFFICE (AMBULATORY)
Dept: URBAN - METROPOLITAN AREA PATHOLOGY 4 | Facility: PATHOLOGY | Age: 48
End: 2023-07-11

## 2023-07-11 ENCOUNTER — AMBULATORY SURGICAL CENTER (AMBULATORY)
Dept: URBAN - METROPOLITAN AREA SURGERY 17 | Facility: SURGERY | Age: 48
End: 2023-07-11

## 2023-07-11 DIAGNOSIS — D12.5 BENIGN NEOPLASM OF SIGMOID COLON: ICD-10-CM

## 2023-07-11 DIAGNOSIS — Z12.11 ENCOUNTER FOR SCREENING FOR MALIGNANT NEOPLASM OF COLON: ICD-10-CM

## 2023-07-11 LAB
GI HISTOLOGY: A. UNSPECIFIED: (no result)
GI HISTOLOGY: PDF REPORT: (no result)

## 2023-07-11 PROCEDURE — 88305 TISSUE EXAM BY PATHOLOGIST: CPT | Performed by: INTERNAL MEDICINE

## 2023-07-11 PROCEDURE — 45385 COLONOSCOPY W/LESION REMOVAL: CPT | Mod: 33 | Performed by: INTERNAL MEDICINE

## 2023-07-11 NOTE — SERVICEHPINOTES
48-year-old gentleman without GI complaints.  Family history is negative for polyps or colon cancer.  He presents for a screening colonoscopy.

## 2023-07-12 ENCOUNTER — TELEPHONE (OUTPATIENT)
Dept: GASTROENTEROLOGY | Facility: CLINIC | Age: 48
End: 2023-07-12

## 2023-07-12 NOTE — TELEPHONE ENCOUNTER
Caller: Deon Worthy    Relationship to patient: Self    Best call back number: [phone]    Patient is needing: PATIENT CALLED IN AND NEEDS HIS PROCEDURE CANCELED PLEASE FOR 8/31/23. HE ISN'T WANTING TO RESCHEDULE IT, SO ISN'T REQUESTING A CALL BACK.

## 2023-10-24 ENCOUNTER — OFFICE VISIT (OUTPATIENT)
Dept: FAMILY MEDICINE CLINIC | Facility: CLINIC | Age: 48
End: 2023-10-24
Payer: COMMERCIAL

## 2023-10-24 VITALS
HEIGHT: 70 IN | OXYGEN SATURATION: 98 % | BODY MASS INDEX: 26.1 KG/M2 | RESPIRATION RATE: 18 BRPM | TEMPERATURE: 97.6 F | HEART RATE: 63 BPM | DIASTOLIC BLOOD PRESSURE: 82 MMHG | WEIGHT: 182.3 LBS | SYSTOLIC BLOOD PRESSURE: 116 MMHG

## 2023-10-24 DIAGNOSIS — E05.90 SUBCLINICAL HYPERTHYROIDISM: ICD-10-CM

## 2023-10-24 DIAGNOSIS — K58.8 OTHER IRRITABLE BOWEL SYNDROME: ICD-10-CM

## 2023-10-24 DIAGNOSIS — Z00.00 ENCOUNTER FOR PREVENTIVE HEALTH EXAMINATION: Primary | ICD-10-CM

## 2023-10-24 DIAGNOSIS — Z13.6 SCREENING FOR CARDIOVASCULAR CONDITION: ICD-10-CM

## 2023-10-24 DIAGNOSIS — I10 ESSENTIAL HYPERTENSION: ICD-10-CM

## 2023-10-24 DIAGNOSIS — Z23 IMMUNIZATION DUE: ICD-10-CM

## 2023-10-24 PROBLEM — J01.00 ACUTE NON-RECURRENT MAXILLARY SINUSITIS: Status: RESOLVED | Noted: 2020-05-19 | Resolved: 2023-10-24

## 2023-10-24 RX ORDER — METOPROLOL SUCCINATE 50 MG/1
50 TABLET, EXTENDED RELEASE ORAL DAILY
Qty: 90 TABLET | Refills: 1 | Status: SHIPPED | OUTPATIENT
Start: 2023-10-24

## 2023-10-24 RX ORDER — DICYCLOMINE HYDROCHLORIDE 10 MG/1
CAPSULE ORAL
Qty: 90 CAPSULE | Refills: 3 | Status: SHIPPED | OUTPATIENT
Start: 2023-10-24

## 2023-10-24 NOTE — PROGRESS NOTES
"Chief Complaint  Annual Exam    Subjective        Deon Worthy presents to [facility]  History of Present Illness  Pt presents today for CPE and labs.  Cscope done at [place] endoscopy in July 2023  Small poylp and repeat 7 years.   Regular exercise, no tobacco, no etoh, no drug use.  No other complaints.    Objective   Vital Signs:  /82 (BP Location: Left arm, Patient Position: Sitting, Cuff Size: Large Adult)   Pulse 63   Temp 97.6 °F (36.4 °C) (Tympanic)   Resp 18   Ht 177.8 cm (70\")   Wt 82.7 kg (182 lb 4.8 oz)   SpO2 98%   BMI 26.16 kg/m²   Estimated body mass index is 26.16 kg/m² as calculated from the following:    Height as of this encounter: 177.8 cm (70\").    Weight as of this encounter: 82.7 kg (182 lb 4.8 oz).               Physical Exam  Vitals and nursing note reviewed.   Constitutional:       General: He is not in acute distress.     Appearance: Normal appearance. He is well-developed. He is not ill-appearing, toxic-appearing or diaphoretic.   HENT:      Head: Normocephalic and atraumatic.      Right Ear: Tympanic membrane, ear canal and external ear normal. There is no impacted cerumen.      Left Ear: Tympanic membrane, ear canal and external ear normal. There is no impacted cerumen.      Nose: Nose normal. No congestion or rhinorrhea.      Mouth/Throat:      Mouth: Mucous membranes are moist.      Pharynx: Oropharynx is clear. No oropharyngeal exudate or posterior oropharyngeal erythema.   Eyes:      General: No scleral icterus.        Right eye: No discharge.         Left eye: No discharge.      Extraocular Movements: Extraocular movements intact.      Conjunctiva/sclera: Conjunctivae normal.      Pupils: Pupils are equal, round, and reactive to light.   Neck:      Thyroid: No thyroid mass or thyromegaly.      Vascular: No carotid bruit or JVD.      Trachea: Trachea normal. No tracheal tenderness or tracheal deviation.   Cardiovascular:      Rate " and Rhythm: Normal rate and regular rhythm.      Heart sounds: Normal heart sounds. No murmur heard.     No friction rub. No gallop.   Pulmonary:      Effort: Pulmonary effort is normal. No respiratory distress.      Breath sounds: Normal breath sounds. No stridor. No wheezing, rhonchi or rales.   Chest:      Chest wall: No tenderness.   Abdominal:      General: Bowel sounds are normal. There is no distension.      Palpations: Abdomen is soft. There is no mass.      Tenderness: There is no abdominal tenderness. There is no right CVA tenderness, left CVA tenderness, guarding or rebound.      Hernia: No hernia is present.   Musculoskeletal:         General: Normal range of motion.      Cervical back: Normal range of motion and neck supple. No rigidity or tenderness.      Right lower leg: No edema.      Left lower leg: No edema.   Lymphadenopathy:      Cervical: No cervical adenopathy.   Skin:     General: Skin is warm and dry.      Coloration: Skin is not jaundiced.   Neurological:      General: No focal deficit present.      Mental Status: He is alert and oriented to person, place, and time. Mental status is at baseline.      Sensory: No sensory deficit.      Motor: No weakness or abnormal muscle tone.      Coordination: Coordination normal.      Gait: Gait normal.      Deep Tendon Reflexes: Reflexes normal.   Psychiatric:         Mood and Affect: Mood normal.         Behavior: Behavior normal.         Thought Content: Thought content normal.         Judgment: Judgment normal.        Result Review :                   Assessment and Plan   Diagnoses and all orders for this visit:    1. Encounter for preventive health examination (Primary)  -     CBC & Differential    2. Subclinical hyperthyroidism  -     TSH Rfx On Abnormal To Free T4    3. Essential hypertension  -     metoprolol succinate XL (TOPROL-XL) 50 MG 24 hr tablet; Take 1 tablet by mouth Daily.  Dispense: 90 tablet; Refill: 1    4. Other irritable bowel  syndrome  -     dicyclomine (Bentyl) 10 MG capsule; Take on pill tid as needed  Dispense: 90 capsule; Refill: 3    5. Screening for cardiovascular condition  -     Comprehensive Metabolic Panel  -     Lipid Panel    6. Immunization due  -     Fluzone (or Fluarix & Flulaval for VFC) >6 Mos ([id number])             Follow Up   Return in about 6 months (around 4/24/2024) for hypertension.  Patient was given instructions and counseling regarding his condition or for health maintenance advice. Please see specific information pulled into the AVS if appropriate.     CPE done and work on diet and exercise.  Labs today and flu shot

## 2023-10-25 LAB
ALBUMIN SERPL-MCNC: 4.9 G/DL (ref 3.5–5.2)
ALBUMIN/GLOB SERPL: 2 G/DL
ALP SERPL-CCNC: 88 U/L (ref 39–117)
ALT SERPL-CCNC: 27 U/L (ref 1–41)
AST SERPL-CCNC: 25 U/L (ref 1–40)
BASOPHILS # BLD AUTO: 0.02 10*3/MM3 (ref 0–0.2)
BASOPHILS NFR BLD AUTO: 0.3 % (ref 0–1.5)
BILIRUB SERPL-MCNC: 0.6 MG/DL (ref 0–1.2)
BUN SERPL-MCNC: 9 MG/DL (ref 6–20)
BUN/CREAT SERPL: 10 (ref 7–25)
CALCIUM SERPL-MCNC: 9.7 MG/DL (ref 8.6–10.5)
CHLORIDE SERPL-SCNC: 104 MMOL/L (ref 98–107)
CHOLEST SERPL-MCNC: 161 MG/DL (ref 0–200)
CO2 SERPL-SCNC: 27 MMOL/L (ref 22–29)
CREAT SERPL-MCNC: 0.9 MG/DL (ref 0.76–1.27)
EGFRCR SERPLBLD CKD-EPI 2021: 104.7 ML/MIN/1.73
EOSINOPHIL # BLD AUTO: 0.25 10*3/MM3 (ref 0–0.4)
EOSINOPHIL NFR BLD AUTO: 4.2 % (ref 0.3–6.2)
ERYTHROCYTE [DISTWIDTH] IN BLOOD BY AUTOMATED COUNT: 12.9 % (ref 12.3–15.4)
GLOBULIN SER CALC-MCNC: 2.4 GM/DL
GLUCOSE SERPL-MCNC: 79 MG/DL (ref 65–99)
HCT VFR BLD AUTO: 46 % (ref 37.5–51)
HDLC SERPL-MCNC: 26 MG/DL (ref 40–60)
HGB BLD-MCNC: 15.7 G/DL (ref 13–17.7)
IMM GRANULOCYTES # BLD AUTO: 0.01 10*3/MM3 (ref 0–0.05)
IMM GRANULOCYTES NFR BLD AUTO: 0.2 % (ref 0–0.5)
LDLC SERPL CALC-MCNC: 105 MG/DL (ref 0–100)
LYMPHOCYTES # BLD AUTO: 2.54 10*3/MM3 (ref 0.7–3.1)
LYMPHOCYTES NFR BLD AUTO: 42.8 % (ref 19.6–45.3)
MCH RBC QN AUTO: 29.6 PG (ref 26.6–33)
MCHC RBC AUTO-ENTMCNC: 34.1 G/DL (ref 31.5–35.7)
MCV RBC AUTO: 86.8 FL (ref 79–97)
MONOCYTES # BLD AUTO: 0.48 10*3/MM3 (ref 0.1–0.9)
MONOCYTES NFR BLD AUTO: 8.1 % (ref 5–12)
NEUTROPHILS # BLD AUTO: 2.63 10*3/MM3 (ref 1.7–7)
NEUTROPHILS NFR BLD AUTO: 44.4 % (ref 42.7–76)
NRBC BLD AUTO-RTO: 0 /100 WBC (ref 0–0.2)
PLATELET # BLD AUTO: 247 10*3/MM3 (ref 140–450)
POTASSIUM SERPL-SCNC: 4.6 MMOL/L (ref 3.5–5.2)
PROT SERPL-MCNC: 7.3 G/DL (ref 6–8.5)
RBC # BLD AUTO: 5.3 10*6/MM3 (ref 4.14–5.8)
SODIUM SERPL-SCNC: 140 MMOL/L (ref 136–145)
TRIGL SERPL-MCNC: 167 MG/DL (ref 0–150)
TSH SERPL DL<=0.005 MIU/L-ACNC: 3.82 UIU/ML (ref 0.27–4.2)
VLDLC SERPL CALC-MCNC: 30 MG/DL (ref 5–40)
WBC # BLD AUTO: 5.93 10*3/MM3 (ref 3.4–10.8)

## 2024-02-12 ENCOUNTER — OFFICE (AMBULATORY)
Dept: URBAN - METROPOLITAN AREA CLINIC 76 | Facility: CLINIC | Age: 49
End: 2024-02-12

## 2024-02-12 VITALS
HEIGHT: 71 IN | SYSTOLIC BLOOD PRESSURE: 140 MMHG | WEIGHT: 186 LBS | DIASTOLIC BLOOD PRESSURE: 90 MMHG | OXYGEN SATURATION: 99 % | HEART RATE: 78 BPM

## 2024-02-12 DIAGNOSIS — R10.32 LEFT LOWER QUADRANT PAIN: ICD-10-CM

## 2024-02-12 DIAGNOSIS — Z86.010 PERSONAL HISTORY OF COLONIC POLYPS: ICD-10-CM

## 2024-02-12 DIAGNOSIS — R10.13 EPIGASTRIC PAIN: ICD-10-CM

## 2024-02-12 PROBLEM — K63.5 POLYP OF COLON: Status: ACTIVE | Noted: 2023-07-11

## 2024-02-12 PROCEDURE — 99214 OFFICE O/P EST MOD 30 MIN: CPT | Performed by: INTERNAL MEDICINE

## 2024-05-15 DIAGNOSIS — I10 ESSENTIAL HYPERTENSION: ICD-10-CM

## 2024-05-15 RX ORDER — METOPROLOL SUCCINATE 50 MG/1
50 TABLET, EXTENDED RELEASE ORAL DAILY
Qty: 90 TABLET | Refills: 1 | OUTPATIENT
Start: 2024-05-15

## 2024-11-04 ENCOUNTER — OFFICE VISIT (OUTPATIENT)
Dept: FAMILY MEDICINE CLINIC | Facility: CLINIC | Age: 49
End: 2024-11-04
Payer: COMMERCIAL

## 2024-11-04 VITALS
TEMPERATURE: 97.8 F | RESPIRATION RATE: 16 BRPM | DIASTOLIC BLOOD PRESSURE: 78 MMHG | OXYGEN SATURATION: 99 % | WEIGHT: 180.6 LBS | SYSTOLIC BLOOD PRESSURE: 120 MMHG | HEIGHT: 70 IN | HEART RATE: 84 BPM | BODY MASS INDEX: 25.86 KG/M2

## 2024-11-04 DIAGNOSIS — Z13.6 SCREENING FOR CARDIOVASCULAR CONDITION: ICD-10-CM

## 2024-11-04 DIAGNOSIS — K58.8 OTHER IRRITABLE BOWEL SYNDROME: ICD-10-CM

## 2024-11-04 DIAGNOSIS — E05.90 SUBCLINICAL HYPERTHYROIDISM: ICD-10-CM

## 2024-11-04 DIAGNOSIS — Z12.5 PROSTATE CANCER SCREENING: ICD-10-CM

## 2024-11-04 DIAGNOSIS — I10 ESSENTIAL HYPERTENSION: ICD-10-CM

## 2024-11-04 DIAGNOSIS — Z00.00 ENCOUNTER FOR PREVENTIVE HEALTH EXAMINATION: Primary | ICD-10-CM

## 2024-11-04 PROCEDURE — 99396 PREV VISIT EST AGE 40-64: CPT | Performed by: FAMILY MEDICINE

## 2024-11-04 RX ORDER — METOPROLOL SUCCINATE 50 MG/1
50 TABLET, EXTENDED RELEASE ORAL DAILY
Qty: 90 TABLET | Refills: 3 | Status: SHIPPED | OUTPATIENT
Start: 2024-11-04

## 2024-11-04 RX ORDER — DICYCLOMINE HYDROCHLORIDE 10 MG/1
CAPSULE ORAL
Qty: 90 CAPSULE | Refills: 3 | Status: SHIPPED | OUTPATIENT
Start: 2024-11-04

## 2024-11-04 NOTE — PROGRESS NOTES
"Chief Complaint  Annual Exam    Subjective        Deon Worthy presents to [facility]  History of Present Illness  Pt presents today for CPE and labs.    Regular exercise, no tobacco, no etoh, no drug use.  .  Cscope  is utd.  He had a calcium score of zero last year.    Objective   Vital Signs:  /78 (BP Location: Right arm, Patient Position: Sitting)   Pulse 84   Temp 97.8 °F (36.6 °C)   Resp 16   Ht 177.8 cm (70\")   Wt 81.9 kg (180 lb 9.6 oz)   SpO2 99%   BMI 25.91 kg/m²   Estimated body mass index is 25.91 kg/m² as calculated from the following:    Height as of this encounter: 177.8 cm (70\").    Weight as of this encounter: 81.9 kg (180 lb 9.6 oz).            Physical Exam  Vitals and nursing note reviewed.   Constitutional:       General: He is not in acute distress.     Appearance: Normal appearance. He is well-developed. He is not ill-appearing, toxic-appearing or diaphoretic.   HENT:      Head: Normocephalic and atraumatic.      Right Ear: Tympanic membrane, ear canal and external ear normal. There is no impacted cerumen.      Left Ear: Tympanic membrane, ear canal and external ear normal. There is no impacted cerumen.      Nose: Nose normal. No congestion or rhinorrhea.      Mouth/Throat:      Mouth: Mucous membranes are moist.      Pharynx: Oropharynx is clear. No oropharyngeal exudate or posterior oropharyngeal erythema.   Eyes:      General: No scleral icterus.        Right eye: No discharge.         Left eye: No discharge.      Extraocular Movements: Extraocular movements intact.      Conjunctiva/sclera: Conjunctivae normal.      Pupils: Pupils are equal, round, and reactive to light.   Neck:      Thyroid: No thyroid mass or thyromegaly.      Vascular: No carotid bruit or JVD.      Trachea: Trachea normal. No tracheal tenderness or tracheal deviation.   Cardiovascular:      Rate and Rhythm: Normal rate and regular rhythm.      Heart sounds: Normal " heart sounds. No murmur heard.     No friction rub. No gallop.   Pulmonary:      Effort: Pulmonary effort is normal. No respiratory distress.      Breath sounds: Normal breath sounds. No stridor. No wheezing, rhonchi or rales.   Chest:      Chest wall: No tenderness.   Abdominal:      General: Bowel sounds are normal. There is no distension.      Palpations: Abdomen is soft. There is no mass.      Tenderness: There is no abdominal tenderness. There is no right CVA tenderness, left CVA tenderness, guarding or rebound.      Hernia: No hernia is present.   Musculoskeletal:         General: No tenderness. Normal range of motion.      Cervical back: Normal range of motion and neck supple. No rigidity or tenderness.      Right lower leg: No edema.      Left lower leg: No edema.   Lymphadenopathy:      Cervical: No cervical adenopathy.   Skin:     General: Skin is warm and dry.      Coloration: Skin is not jaundiced.   Neurological:      General: No focal deficit present.      Mental Status: He is alert and oriented to person, place, and time. Mental status is at baseline.      Sensory: No sensory deficit.      Motor: No weakness or abnormal muscle tone.      Coordination: Coordination normal.      Gait: Gait normal.      Deep Tendon Reflexes: Reflexes normal.   Psychiatric:         Mood and Affect: Mood normal.         Behavior: Behavior normal.         Thought Content: Thought content normal.         Judgment: Judgment normal.        Result Review :                Assessment and Plan   Diagnoses and all orders for this visit:    1. Encounter for preventive health examination (Primary)  -     CBC & Differential  -     Comprehensive Metabolic Panel  -     Lipid Panel  -     TSH Rfx On Abnormal To Free T4    2. Subclinical hyperthyroidism  -     TSH Rfx On Abnormal To Free T4    3. Screening for cardiovascular condition  -     Comprehensive Metabolic Panel  -     Lipid Panel    4. Prostate cancer screening  -     PSA  Screen    5. Other irritable bowel syndrome  -     dicyclomine (Bentyl) 10 MG capsule; Take on pill tid as needed  Dispense: 90 capsule; Refill: 3    6. Essential hypertension  -     metoprolol succinate XL (TOPROL-XL) 50 MG 24 hr tablet; Take 1 tablet by mouth Daily.  Dispense: 90 tablet; Refill: 3             Follow Up   Return in about 6 months (around 5/4/2025).  Patient was given instructions and counseling regarding his condition or for health maintenance advice. Please see specific information pulled into the AVS if appropriate.       CPE done.  Continue to work on diet, exercise, and weight loss.  Labs done today.

## 2024-11-05 LAB
ALBUMIN SERPL-MCNC: 4.5 G/DL (ref 3.5–5.2)
ALBUMIN/GLOB SERPL: 1.7 G/DL
ALP SERPL-CCNC: 82 U/L (ref 39–117)
ALT SERPL-CCNC: 22 U/L (ref 1–41)
AST SERPL-CCNC: 20 U/L (ref 1–40)
BASOPHILS # BLD AUTO: 0.02 10*3/MM3 (ref 0–0.2)
BASOPHILS NFR BLD AUTO: 0.3 % (ref 0–1.5)
BILIRUB SERPL-MCNC: 0.7 MG/DL (ref 0–1.2)
BUN SERPL-MCNC: 10 MG/DL (ref 6–20)
BUN/CREAT SERPL: 10.4 (ref 7–25)
CALCIUM SERPL-MCNC: 9.6 MG/DL (ref 8.6–10.5)
CHLORIDE SERPL-SCNC: 103 MMOL/L (ref 98–107)
CHOLEST SERPL-MCNC: 138 MG/DL (ref 0–200)
CO2 SERPL-SCNC: 26.6 MMOL/L (ref 22–29)
CREAT SERPL-MCNC: 0.96 MG/DL (ref 0.76–1.27)
EGFRCR SERPLBLD CKD-EPI 2021: 96.3 ML/MIN/1.73
EOSINOPHIL # BLD AUTO: 0.27 10*3/MM3 (ref 0–0.4)
EOSINOPHIL NFR BLD AUTO: 4.1 % (ref 0.3–6.2)
ERYTHROCYTE [DISTWIDTH] IN BLOOD BY AUTOMATED COUNT: 12.6 % (ref 12.3–15.4)
GLOBULIN SER CALC-MCNC: 2.7 GM/DL
GLUCOSE SERPL-MCNC: 80 MG/DL (ref 65–99)
HCT VFR BLD AUTO: 47.2 % (ref 37.5–51)
HDLC SERPL-MCNC: 24 MG/DL (ref 40–60)
HGB BLD-MCNC: 15.2 G/DL (ref 13–17.7)
IMM GRANULOCYTES # BLD AUTO: 0.01 10*3/MM3 (ref 0–0.05)
IMM GRANULOCYTES NFR BLD AUTO: 0.2 % (ref 0–0.5)
LDLC SERPL CALC-MCNC: 85 MG/DL (ref 0–100)
LYMPHOCYTES # BLD AUTO: 2.86 10*3/MM3 (ref 0.7–3.1)
LYMPHOCYTES NFR BLD AUTO: 43.6 % (ref 19.6–45.3)
MCH RBC QN AUTO: 28.3 PG (ref 26.6–33)
MCHC RBC AUTO-ENTMCNC: 32.2 G/DL (ref 31.5–35.7)
MCV RBC AUTO: 87.9 FL (ref 79–97)
MONOCYTES # BLD AUTO: 0.59 10*3/MM3 (ref 0.1–0.9)
MONOCYTES NFR BLD AUTO: 9 % (ref 5–12)
NEUTROPHILS # BLD AUTO: 2.81 10*3/MM3 (ref 1.7–7)
NEUTROPHILS NFR BLD AUTO: 42.8 % (ref 42.7–76)
NRBC BLD AUTO-RTO: 0 /100 WBC (ref 0–0.2)
PLATELET # BLD AUTO: 249 10*3/MM3 (ref 140–450)
POTASSIUM SERPL-SCNC: 4.3 MMOL/L (ref 3.5–5.2)
PROT SERPL-MCNC: 7.2 G/DL (ref 6–8.5)
PSA SERPL-MCNC: 1.3 NG/ML (ref 0–4)
RBC # BLD AUTO: 5.37 10*6/MM3 (ref 4.14–5.8)
SODIUM SERPL-SCNC: 140 MMOL/L (ref 136–145)
TRIGL SERPL-MCNC: 166 MG/DL (ref 0–150)
TSH SERPL DL<=0.005 MIU/L-ACNC: 4.3 UIU/ML (ref 0.27–4.2)
VLDLC SERPL CALC-MCNC: 29 MG/DL (ref 5–40)
WBC # BLD AUTO: 6.56 10*3/MM3 (ref 3.4–10.8)

## 2024-12-18 NOTE — TELEPHONE ENCOUNTER
LMTCB  
Left detailed message regarding b12 shots   
PT RETURNED PHONE CALL AND IS WANTING A CALL BACK ABOUT THE B-12 SHOTS.  
Please advise   
Pt ask that you return his call.  He states he is filling about 50% better is there anything else he needs to do? Pt states you mentioned something about injections.   
Weekly b12 shots for a month and then monthly after that.  
verbal cues/nonverbal cues (demo/gestures)

## 2025-03-03 ENCOUNTER — OFFICE VISIT (OUTPATIENT)
Dept: FAMILY MEDICINE CLINIC | Facility: CLINIC | Age: 50
End: 2025-03-03
Payer: COMMERCIAL

## 2025-03-03 VITALS
OXYGEN SATURATION: 97 % | DIASTOLIC BLOOD PRESSURE: 84 MMHG | HEART RATE: 84 BPM | SYSTOLIC BLOOD PRESSURE: 124 MMHG | WEIGHT: 174.6 LBS | RESPIRATION RATE: 16 BRPM | HEIGHT: 70 IN | TEMPERATURE: 97.9 F | BODY MASS INDEX: 25 KG/M2

## 2025-03-03 DIAGNOSIS — I10 ESSENTIAL HYPERTENSION: ICD-10-CM

## 2025-03-03 DIAGNOSIS — R07.89 CHEST DISCOMFORT: Primary | ICD-10-CM

## 2025-03-03 PROCEDURE — 99214 OFFICE O/P EST MOD 30 MIN: CPT | Performed by: FAMILY MEDICINE

## 2025-03-03 PROCEDURE — 93000 ELECTROCARDIOGRAM COMPLETE: CPT | Performed by: FAMILY MEDICINE

## 2025-03-03 NOTE — PROGRESS NOTES
"Chief Complaint  Hypertension    Subjective        Deon Worthy presents to [facility]  Hypertension      Pt presents today for follow up and  HTN- his diastolic has been in 90s over past 5-6 weeks. Diastolic has been in 130s.  He does have left side rib pain that is worse when he pushes on it but wants to make sure it is not heart related and wants to get EKG.  No palpitations.    Last 2023 had stress test, echo and EKG in [place] and all was normal.    Objective   Vital Signs:  /84 (BP Location: Left arm, Patient Position: Sitting)   Pulse 84   Temp 97.9 °F (36.6 °C)   Resp 16   Ht 177.8 cm (70\")   Wt 79.2 kg (174 lb 9.6 oz)   SpO2 97%   BMI 25.05 kg/m²   Estimated body mass index is 25.05 kg/m² as calculated from the following:    Height as of this encounter: 177.8 cm (70\").    Weight as of this encounter: 79.2 kg (174 lb 9.6 oz).            Physical Exam  Vitals and nursing note reviewed.   Constitutional:       Appearance: Normal appearance. He is well-developed.   Cardiovascular:      Rate and Rhythm: Normal rate and regular rhythm.      Heart sounds: Normal heart sounds. No murmur heard.  Pulmonary:      Effort: Pulmonary effort is normal. No respiratory distress.      Breath sounds: Normal breath sounds. No stridor. No wheezing or rhonchi.   Musculoskeletal:      Comments: Left side chest some ttp lower chest and laterally.     Neurological:      General: No focal deficit present.      Mental Status: He is alert and oriented to person, place, and time. He is not disoriented.   Psychiatric:         Mood and Affect: Mood normal.         Behavior: Behavior normal.        Result Review :           ECG 12 Lead    Date/Time: 3/3/2025 8:25 AM  Performed by: Meenakshi Singh MD    Authorized by: Meenakshi Sinhg MD  Comparison: compared with previous ECG from 10/21/2021  Similar to previous ECG  Comparison to previous ECG: Normal EKG  Rhythm: sinus rhythm  Rate: " normal  Conduction: conduction normal  ST Segments: ST segments normal  T Waves: T waves normal  QRS axis: normal  Other: no other findings    Clinical impression: normal ECG            Assessment and Plan   Diagnoses and all orders for this visit:    1. Chest discomfort (Primary)  -     ECG 12 Lead    2. Essential hypertension  -     ECG 12 Lead             Follow Up   No follow-ups on file.  Patient was given instructions and counseling regarding his condition or for health maintenance advice. Please see specific information pulled into the AVS if appropriate.       EKG done and was normal.  Pain most likely muscular since worse with palpating.      Patient to monitor BP over next week and call me with readings at that time and we can make adjustments accordingly if needed.  Given warning signs for stroke and MI.  Will increase metoprolol to 75 mg over next week and he will let me know how his BP is doing over this time.